# Patient Record
Sex: FEMALE | URBAN - METROPOLITAN AREA
[De-identification: names, ages, dates, MRNs, and addresses within clinical notes are randomized per-mention and may not be internally consistent; named-entity substitution may affect disease eponyms.]

---

## 2019-10-31 ENCOUNTER — OFFICE VISIT (OUTPATIENT)
Dept: URBAN - METROPOLITAN AREA CLINIC 29 | Facility: CLINIC | Age: 29
End: 2019-10-31
Payer: COMMERCIAL

## 2019-10-31 DIAGNOSIS — H00.14 CHALAZION LEFT UPPER EYELID: Primary | ICD-10-CM

## 2019-10-31 PROCEDURE — 99202 OFFICE O/P NEW SF 15 MIN: CPT | Performed by: OPTOMETRIST

## 2019-10-31 PROCEDURE — 99203 OFFICE O/P NEW LOW 30 MIN: CPT | Performed by: OPTOMETRIST

## 2019-10-31 NOTE — IMPRESSION/PLAN
Impression: Chalazion left upper eyelid: H00.14. OS. Plan: Discussed diagnosis in detail with patient. Discussed treatment options with patient. Lid scrubs and hygiene were explained. Patient instructed to use artificial tears as needed Consult recommended [[facility]Pl[facility] Surgeon].

## 2019-11-04 ENCOUNTER — OFFICE VISIT (OUTPATIENT)
Dept: URBAN - METROPOLITAN AREA CLINIC 23 | Facility: CLINIC | Age: 29
End: 2019-11-04
Payer: COMMERCIAL

## 2019-11-04 PROCEDURE — 99204 OFFICE O/P NEW MOD 45 MIN: CPT | Performed by: OPHTHALMOLOGY

## 2019-11-04 RX ORDER — TOBRAMYCIN AND DEXAMETHASONE 3; 1 MG/G; MG/G
OINTMENT OPHTHALMIC
Qty: 1 | Refills: 0 | Status: ACTIVE
Start: 2019-11-04

## 2019-11-04 ASSESSMENT — INTRAOCULAR PRESSURE
OD: 19
OS: 14

## 2019-11-04 NOTE — IMPRESSION/PLAN
Impression: Chalazion left upper eyelid: H00.14. Plan: Discussed diagnosis in detail with patient. Discussed treatment options with patient. Patient instructed to apply warm compresses w/ firm massage. Lid scrubs and hygiene were explained, start Ocusoft plus. Start Maxitrol emelyn BID to to left upper Eyelid. If no improvement consider Kenalog Injection and/or I&D of chalazion of left upper Eyelid. Need clearance from OBGYN for [address]80, /1190 E1. D/c Contacts.